# Patient Record
Sex: FEMALE | Race: WHITE | NOT HISPANIC OR LATINO
[De-identification: names, ages, dates, MRNs, and addresses within clinical notes are randomized per-mention and may not be internally consistent; named-entity substitution may affect disease eponyms.]

---

## 2019-11-04 PROBLEM — Z00.00 ENCOUNTER FOR PREVENTIVE HEALTH EXAMINATION: Status: ACTIVE | Noted: 2019-11-04

## 2019-11-05 ENCOUNTER — TRANSCRIPTION ENCOUNTER (OUTPATIENT)
Age: 29
End: 2019-11-05

## 2019-11-05 ENCOUNTER — APPOINTMENT (OUTPATIENT)
Dept: HEMATOLOGY ONCOLOGY | Facility: CLINIC | Age: 29
End: 2019-11-05
Payer: COMMERCIAL

## 2019-11-05 VITALS
DIASTOLIC BLOOD PRESSURE: 77 MMHG | BODY MASS INDEX: 21.49 KG/M2 | WEIGHT: 129 LBS | HEART RATE: 111 BPM | RESPIRATION RATE: 15 BRPM | OXYGEN SATURATION: 100 % | HEIGHT: 65 IN | TEMPERATURE: 97.9 F | SYSTOLIC BLOOD PRESSURE: 117 MMHG

## 2019-11-05 DIAGNOSIS — Z80.9 FAMILY HISTORY OF MALIGNANT NEOPLASM, UNSPECIFIED: ICD-10-CM

## 2019-11-05 DIAGNOSIS — Z81.8 FAMILY HISTORY OF OTHER MENTAL AND BEHAVIORAL DISORDERS: ICD-10-CM

## 2019-11-05 DIAGNOSIS — Z78.9 OTHER SPECIFIED HEALTH STATUS: ICD-10-CM

## 2019-11-05 DIAGNOSIS — D68.1 HEREDITARY FACTOR XI DEFICIENCY: ICD-10-CM

## 2019-11-05 DIAGNOSIS — Z82.3 FAMILY HISTORY OF STROKE: ICD-10-CM

## 2019-11-05 DIAGNOSIS — D56.3 THALASSEMIA MINOR: ICD-10-CM

## 2019-11-05 DIAGNOSIS — E53.8 DEFICIENCY OF OTHER SPECIFIED B GROUP VITAMINS: ICD-10-CM

## 2019-11-05 PROCEDURE — 96372 THER/PROPH/DIAG INJ SC/IM: CPT

## 2019-11-05 PROCEDURE — 99204 OFFICE O/P NEW MOD 45 MIN: CPT | Mod: 25

## 2019-11-05 PROCEDURE — 36415 COLL VENOUS BLD VENIPUNCTURE: CPT

## 2019-11-05 RX ORDER — ALBUTEROL SULFATE 108 UG/1
AEROSOL, METERED RESPIRATORY (INHALATION)
Refills: 0 | Status: ACTIVE | COMMUNITY

## 2019-11-05 RX ORDER — BUPROPION HYDROCHLORIDE 300 MG/1
300 TABLET, EXTENDED RELEASE ORAL DAILY
Refills: 0 | Status: ACTIVE | COMMUNITY

## 2019-11-05 RX ORDER — BACILLUS COAGULANS/INULIN 1B-250 MG
CAPSULE ORAL
Refills: 0 | Status: ACTIVE | COMMUNITY

## 2019-11-05 RX ORDER — CYANOCOBALAMIN 1000 UG/ML
1000 INJECTION INTRAMUSCULAR; SUBCUTANEOUS
Qty: 0 | Refills: 0 | Status: COMPLETED | OUTPATIENT
Start: 2019-11-05

## 2019-11-05 RX ORDER — ASCORBIC ACID, CHOLECALCIFEROL, .ALPHA.-TOCOPHEROL ACETATE, DL-, PYRIDOXINE HYDROCHLORIDE, FOLIC ACID, CYANOCOBALAMIN, BIOTIN, CALCIUM CARBONATE, FERROUS ASPARTO GLYCINATE, IRON, POTASSIUM IODIDE, MAGNESIUM OXIDE, DOCONEXENT AND LOWBUSH BLUEBERRY 60; 1000; 10; 26; 400; 13; 280; 80; 9; 9; 150; 25; 350; 25; 600 MG/1; [IU]/1; [IU]/1; MG/1; UG/1; UG/1; UG/1; MG/1; MG/1; MG/1; UG/1; MG/1; MG/1; MG/1; UG/1
CAPSULE, GELATIN COATED ORAL DAILY
Refills: 0 | Status: ACTIVE | COMMUNITY

## 2019-11-05 RX ORDER — CETIRIZINE HCL/PSEUDOEPHEDRINE 5 MG-120MG
TABLET, EXTENDED RELEASE 12 HR ORAL DAILY
Refills: 0 | Status: ACTIVE | COMMUNITY

## 2019-11-05 RX ADMIN — CYANOCOBALAMIN 0 MCG/ML: 1000 INJECTION INTRAMUSCULAR; SUBCUTANEOUS at 00:00

## 2019-11-06 ENCOUNTER — TRANSCRIPTION ENCOUNTER (OUTPATIENT)
Age: 29
End: 2019-11-06

## 2019-11-06 LAB
BASOPHILS # BLD AUTO: 0.05 K/UL
BASOPHILS NFR BLD AUTO: 0.7 %
EOSINOPHIL # BLD AUTO: 0.2 K/UL
EOSINOPHIL NFR BLD AUTO: 2.8 %
ERYTHROCYTE [SEDIMENTATION RATE] IN BLOOD BY WESTERGREN METHOD: 21 MM/HR
FACT XI ACT/NOR PPP: 41 %
FERRITIN SERPL-MCNC: 47 NG/ML
HAPTOGLOB SERPL-MCNC: 102 MG/DL
HCT VFR BLD CALC: 39.3 %
HGB BLD-MCNC: 12.3 G/DL
IMM GRANULOCYTES NFR BLD AUTO: 0.1 %
IRON SATN MFR SERPL: 22 %
IRON SERPL-MCNC: 73 UG/DL
LDH SERPL-CCNC: 154 U/L
LYMPHOCYTES # BLD AUTO: 2.56 K/UL
LYMPHOCYTES NFR BLD AUTO: 36.4 %
MAN DIFF?: NORMAL
MCHC RBC-ENTMCNC: 26.4 PG
MCHC RBC-ENTMCNC: 31.3 GM/DL
MCV RBC AUTO: 84.3 FL
MONOCYTES # BLD AUTO: 0.55 K/UL
MONOCYTES NFR BLD AUTO: 7.8 %
NEUTROPHILS # BLD AUTO: 3.66 K/UL
NEUTROPHILS NFR BLD AUTO: 52.2 %
PLATELET # BLD AUTO: 285 K/UL
RBC # BLD: 4.66 M/UL
RBC # FLD: 13.2 %
TIBC SERPL-MCNC: 332 UG/DL
UIBC SERPL-MCNC: 259 UG/DL
VIT B12 SERPL-MCNC: 374 PG/ML
WBC # FLD AUTO: 7.03 K/UL

## 2019-11-07 ENCOUNTER — TRANSCRIPTION ENCOUNTER (OUTPATIENT)
Age: 29
End: 2019-11-07

## 2019-11-11 ENCOUNTER — TRANSCRIPTION ENCOUNTER (OUTPATIENT)
Age: 29
End: 2019-11-11

## 2019-11-14 ENCOUNTER — TRANSCRIPTION ENCOUNTER (OUTPATIENT)
Age: 29
End: 2019-11-14

## 2019-11-14 PROBLEM — D56.3 ALPHA THALASSEMIA TRAIT: Status: ACTIVE | Noted: 2019-11-05

## 2019-11-14 LAB
ALPHA - GLOBIN COMMON MUTATION RESULT: NORMAL
ALPHA - GLOBIN MUTATION VERBATIM: NORMAL

## 2019-11-14 NOTE — CONSULT LETTER
[Dear  ___] : Dear  [unfilled], [Consult Letter:] : I had the pleasure of evaluating your patient, [unfilled]. [Consult Closing:] : Thank you very much for allowing me to participate in the care of this patient.  If you have any questions, please do not hesitate to contact me. [Please see my note below.] : Please see my note below. [Sincerely,] : Sincerely, [FreeTextEntry3] : Lydia Lawson MD\par

## 2019-11-14 NOTE — HISTORY OF PRESENT ILLNESS
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

## 2019-11-14 NOTE — ASSESSMENT
[FreeTextEntry1] : Repeat blood work as above..\par \par Pt has a factor XI def, and has a personal as well as family Hx of bleeding and will therefor require transfusion of FFP with spinal anesthesia for procedure (such as delivery, etc...)\par \par \par Alpha mutation gene analysis , was positive...pt is carrier for the mutation!

## 2019-11-18 ENCOUNTER — TRANSCRIPTION ENCOUNTER (OUTPATIENT)
Age: 29
End: 2019-11-18

## 2020-11-12 ENCOUNTER — TRANSCRIPTION ENCOUNTER (OUTPATIENT)
Age: 30
End: 2020-11-12

## 2021-03-23 ENCOUNTER — OUTPATIENT (OUTPATIENT)
Dept: OUTPATIENT SERVICES | Facility: HOSPITAL | Age: 31
LOS: 1 days | End: 2021-03-23
Payer: COMMERCIAL

## 2021-03-23 DIAGNOSIS — O26.899 OTHER SPECIFIED PREGNANCY RELATED CONDITIONS, UNSPECIFIED TRIMESTER: ICD-10-CM

## 2021-03-23 DIAGNOSIS — Z3A.00 WEEKS OF GESTATION OF PREGNANCY NOT SPECIFIED: ICD-10-CM

## 2021-03-23 PROCEDURE — 99214 OFFICE O/P EST MOD 30 MIN: CPT

## 2021-03-25 ENCOUNTER — INPATIENT (INPATIENT)
Facility: HOSPITAL | Age: 31
LOS: 1 days | Discharge: HOME | End: 2021-03-27
Attending: OBSTETRICS & GYNECOLOGY | Admitting: OBSTETRICS & GYNECOLOGY
Payer: COMMERCIAL

## 2021-03-25 VITALS — HEIGHT: 65 IN | WEIGHT: 175.27 LBS

## 2021-03-25 DIAGNOSIS — Z3A.00 WEEKS OF GESTATION OF PREGNANCY NOT SPECIFIED: ICD-10-CM

## 2021-03-25 DIAGNOSIS — O26.899 OTHER SPECIFIED PREGNANCY RELATED CONDITIONS, UNSPECIFIED TRIMESTER: ICD-10-CM

## 2021-03-25 LAB
ALBUMIN SERPL ELPH-MCNC: 3.9 G/DL — SIGNIFICANT CHANGE UP (ref 3.3–5)
ALP SERPL-CCNC: 137 U/L — HIGH (ref 40–120)
ALT FLD-CCNC: 17 U/L — SIGNIFICANT CHANGE UP (ref 10–45)
ANION GAP SERPL CALC-SCNC: 15 MMOL/L — SIGNIFICANT CHANGE UP (ref 5–17)
APPEARANCE UR: CLEAR — SIGNIFICANT CHANGE UP
APTT BLD: 32.6 SEC — SIGNIFICANT CHANGE UP (ref 27.5–35.5)
AST SERPL-CCNC: 20 U/L — SIGNIFICANT CHANGE UP (ref 10–40)
BACTERIA # UR AUTO: PRESENT /HPF
BASOPHILS # BLD AUTO: 0.06 K/UL — SIGNIFICANT CHANGE UP (ref 0–0.2)
BASOPHILS NFR BLD AUTO: 0.3 % — SIGNIFICANT CHANGE UP (ref 0–2)
BILIRUB SERPL-MCNC: 0.3 MG/DL — SIGNIFICANT CHANGE UP (ref 0.2–1.2)
BILIRUB UR-MCNC: NEGATIVE — SIGNIFICANT CHANGE UP
BLD GP AB SCN SERPL QL: NEGATIVE — SIGNIFICANT CHANGE UP
BUN SERPL-MCNC: 7 MG/DL — SIGNIFICANT CHANGE UP (ref 7–23)
CALCIUM SERPL-MCNC: 9.2 MG/DL — SIGNIFICANT CHANGE UP (ref 8.4–10.5)
CHLORIDE SERPL-SCNC: 100 MMOL/L — SIGNIFICANT CHANGE UP (ref 96–108)
CO2 SERPL-SCNC: 20 MMOL/L — LOW (ref 22–31)
COLOR SPEC: YELLOW — SIGNIFICANT CHANGE UP
COVID-19 SPIKE DOMAIN AB INTERP: NEGATIVE — SIGNIFICANT CHANGE UP
COVID-19 SPIKE DOMAIN ANTIBODY RESULT: 0.4 U/ML — SIGNIFICANT CHANGE UP
CREAT ?TM UR-MCNC: 223 MG/DL — SIGNIFICANT CHANGE UP
CREAT SERPL-MCNC: 0.62 MG/DL — SIGNIFICANT CHANGE UP (ref 0.5–1.3)
DIFF PNL FLD: ABNORMAL
EOSINOPHIL # BLD AUTO: 0.04 K/UL — SIGNIFICANT CHANGE UP (ref 0–0.5)
EOSINOPHIL NFR BLD AUTO: 0.2 % — SIGNIFICANT CHANGE UP (ref 0–6)
EPI CELLS # UR: ABNORMAL /HPF (ref 0–5)
FIBRINOGEN PPP-MCNC: 673 MG/DL — HIGH (ref 258–438)
GLUCOSE SERPL-MCNC: 123 MG/DL — HIGH (ref 70–99)
GLUCOSE UR QL: NEGATIVE — SIGNIFICANT CHANGE UP
HCT VFR BLD CALC: 33.9 % — LOW (ref 34.5–45)
HGB BLD-MCNC: 10.8 G/DL — LOW (ref 11.5–15.5)
IMM GRANULOCYTES NFR BLD AUTO: 0.7 % — SIGNIFICANT CHANGE UP (ref 0–1.5)
INR BLD: 1.01 — SIGNIFICANT CHANGE UP (ref 0.88–1.16)
KETONES UR-MCNC: NEGATIVE — SIGNIFICANT CHANGE UP
LDH SERPL L TO P-CCNC: 158 U/L — SIGNIFICANT CHANGE UP (ref 50–242)
LEUKOCYTE ESTERASE UR-ACNC: ABNORMAL
LYMPHOCYTES # BLD AUTO: 1.17 K/UL — SIGNIFICANT CHANGE UP (ref 1–3.3)
LYMPHOCYTES # BLD AUTO: 6.5 % — LOW (ref 13–44)
MCHC RBC-ENTMCNC: 26.3 PG — LOW (ref 27–34)
MCHC RBC-ENTMCNC: 31.9 GM/DL — LOW (ref 32–36)
MCV RBC AUTO: 82.5 FL — SIGNIFICANT CHANGE UP (ref 80–100)
MONOCYTES # BLD AUTO: 0.73 K/UL — SIGNIFICANT CHANGE UP (ref 0–0.9)
MONOCYTES NFR BLD AUTO: 4 % — SIGNIFICANT CHANGE UP (ref 2–14)
NEUTROPHILS # BLD AUTO: 16.01 K/UL — HIGH (ref 1.8–7.4)
NEUTROPHILS NFR BLD AUTO: 88.3 % — HIGH (ref 43–77)
NITRITE UR-MCNC: NEGATIVE — SIGNIFICANT CHANGE UP
NRBC # BLD: 0 /100 WBCS — SIGNIFICANT CHANGE UP (ref 0–0)
PH UR: 6.5 — SIGNIFICANT CHANGE UP (ref 5–8)
PLATELET # BLD AUTO: 253 K/UL — SIGNIFICANT CHANGE UP (ref 150–400)
POTASSIUM SERPL-MCNC: 3.8 MMOL/L — SIGNIFICANT CHANGE UP (ref 3.5–5.3)
POTASSIUM SERPL-SCNC: 3.8 MMOL/L — SIGNIFICANT CHANGE UP (ref 3.5–5.3)
PROT ?TM UR-MCNC: 37 MG/DL — HIGH (ref 0–12)
PROT SERPL-MCNC: 7.1 G/DL — SIGNIFICANT CHANGE UP (ref 6–8.3)
PROT UR-MCNC: ABNORMAL MG/DL
PROT/CREAT UR-RTO: 0.2 RATIO — SIGNIFICANT CHANGE UP (ref 0–0.2)
PROTHROM AB SERPL-ACNC: 12.1 SEC — SIGNIFICANT CHANGE UP (ref 10.6–13.6)
RBC # BLD: 4.11 M/UL — SIGNIFICANT CHANGE UP (ref 3.8–5.2)
RBC # FLD: 14.8 % — HIGH (ref 10.3–14.5)
RBC CASTS # UR COMP ASSIST: ABNORMAL /HPF
RH IG SCN BLD-IMP: POSITIVE — SIGNIFICANT CHANGE UP
RH IG SCN BLD-IMP: POSITIVE — SIGNIFICANT CHANGE UP
SARS-COV-2 IGG+IGM SERPL QL IA: 0.4 U/ML — SIGNIFICANT CHANGE UP
SARS-COV-2 IGG+IGM SERPL QL IA: NEGATIVE — SIGNIFICANT CHANGE UP
SARS-COV-2 RNA SPEC QL NAA+PROBE: SIGNIFICANT CHANGE UP
SODIUM SERPL-SCNC: 135 MMOL/L — SIGNIFICANT CHANGE UP (ref 135–145)
SP GR SPEC: 1.02 — SIGNIFICANT CHANGE UP (ref 1–1.03)
T PALLIDUM AB TITR SER: NEGATIVE — SIGNIFICANT CHANGE UP
URATE SERPL-MCNC: 4.7 MG/DL — SIGNIFICANT CHANGE UP (ref 2.5–7)
UROBILINOGEN FLD QL: 0.2 E.U./DL — SIGNIFICANT CHANGE UP
WBC # BLD: 18.13 K/UL — HIGH (ref 3.8–10.5)
WBC # FLD AUTO: 18.13 K/UL — HIGH (ref 3.8–10.5)
WBC UR QL: < 5 /HPF — SIGNIFICANT CHANGE UP

## 2021-03-25 RX ORDER — OXYTOCIN 10 UNIT/ML
333.33 VIAL (ML) INJECTION
Qty: 20 | Refills: 0 | Status: DISCONTINUED | OUTPATIENT
Start: 2021-03-25 | End: 2021-03-27

## 2021-03-25 RX ORDER — AMPICILLIN SODIUM AND SULBACTAM SODIUM 250; 125 MG/ML; MG/ML
3 INJECTION, POWDER, FOR SUSPENSION INTRAMUSCULAR; INTRAVENOUS EVERY 6 HOURS
Refills: 0 | Status: COMPLETED | OUTPATIENT
Start: 2021-03-26 | End: 2021-03-26

## 2021-03-25 RX ORDER — SODIUM CHLORIDE 9 MG/ML
5001 INJECTION, SOLUTION INTRAVENOUS ONCE
Refills: 0 | Status: DISCONTINUED | OUTPATIENT
Start: 2021-03-25 | End: 2021-03-25

## 2021-03-25 RX ORDER — SIMETHICONE 80 MG/1
80 TABLET, CHEWABLE ORAL EVERY 4 HOURS
Refills: 0 | Status: DISCONTINUED | OUTPATIENT
Start: 2021-03-25 | End: 2021-03-27

## 2021-03-25 RX ORDER — OXYCODONE HYDROCHLORIDE 5 MG/1
5 TABLET ORAL
Refills: 0 | Status: DISCONTINUED | OUTPATIENT
Start: 2021-03-25 | End: 2021-03-27

## 2021-03-25 RX ORDER — AMPICILLIN TRIHYDRATE 250 MG
1 CAPSULE ORAL EVERY 4 HOURS
Refills: 0 | Status: DISCONTINUED | OUTPATIENT
Start: 2021-03-25 | End: 2021-03-25

## 2021-03-25 RX ORDER — AER TRAVELER 0.5 G/1
1 SOLUTION RECTAL; TOPICAL EVERY 4 HOURS
Refills: 0 | Status: DISCONTINUED | OUTPATIENT
Start: 2021-03-25 | End: 2021-03-27

## 2021-03-25 RX ORDER — FENTANYL/BUPIVACAINE/NS/PF 2MCG/ML-.1
250 PLASTIC BAG, INJECTION (ML) INJECTION
Refills: 0 | Status: DISCONTINUED | OUTPATIENT
Start: 2021-03-25 | End: 2021-03-25

## 2021-03-25 RX ORDER — SODIUM CHLORIDE 9 MG/ML
3 INJECTION INTRAMUSCULAR; INTRAVENOUS; SUBCUTANEOUS EVERY 8 HOURS
Refills: 0 | Status: DISCONTINUED | OUTPATIENT
Start: 2021-03-25 | End: 2021-03-27

## 2021-03-25 RX ORDER — OXYCODONE HYDROCHLORIDE 5 MG/1
5 TABLET ORAL ONCE
Refills: 0 | Status: DISCONTINUED | OUTPATIENT
Start: 2021-03-25 | End: 2021-03-27

## 2021-03-25 RX ORDER — KETOROLAC TROMETHAMINE 30 MG/ML
30 SYRINGE (ML) INJECTION ONCE
Refills: 0 | Status: DISCONTINUED | OUTPATIENT
Start: 2021-03-25 | End: 2021-03-25

## 2021-03-25 RX ORDER — ACETAMINOPHEN 500 MG
1000 TABLET ORAL ONCE
Refills: 0 | Status: DISCONTINUED | OUTPATIENT
Start: 2021-03-25 | End: 2021-03-27

## 2021-03-25 RX ORDER — GENTAMICIN SULFATE 40 MG/ML
330 VIAL (ML) INJECTION ONCE
Refills: 0 | Status: COMPLETED | OUTPATIENT
Start: 2021-03-25 | End: 2021-03-25

## 2021-03-25 RX ORDER — MAGNESIUM HYDROXIDE 400 MG/1
30 TABLET, CHEWABLE ORAL
Refills: 0 | Status: DISCONTINUED | OUTPATIENT
Start: 2021-03-25 | End: 2021-03-27

## 2021-03-25 RX ORDER — LANOLIN
1 OINTMENT (GRAM) TOPICAL EVERY 6 HOURS
Refills: 0 | Status: DISCONTINUED | OUTPATIENT
Start: 2021-03-25 | End: 2021-03-27

## 2021-03-25 RX ORDER — AMPICILLIN TRIHYDRATE 250 MG
2 CAPSULE ORAL ONCE
Refills: 0 | Status: COMPLETED | OUTPATIENT
Start: 2021-03-25 | End: 2021-03-25

## 2021-03-25 RX ORDER — ACETAMINOPHEN 500 MG
1000 TABLET ORAL ONCE
Refills: 0 | Status: COMPLETED | OUTPATIENT
Start: 2021-03-25 | End: 2021-03-25

## 2021-03-25 RX ORDER — SODIUM CHLORIDE 9 MG/ML
1000 INJECTION, SOLUTION INTRAVENOUS
Refills: 0 | Status: DISCONTINUED | OUTPATIENT
Start: 2021-03-25 | End: 2021-03-25

## 2021-03-25 RX ORDER — PRAMOXINE HYDROCHLORIDE 150 MG/15G
1 AEROSOL, FOAM RECTAL EVERY 4 HOURS
Refills: 0 | Status: DISCONTINUED | OUTPATIENT
Start: 2021-03-25 | End: 2021-03-27

## 2021-03-25 RX ORDER — IBUPROFEN 200 MG
600 TABLET ORAL EVERY 6 HOURS
Refills: 0 | Status: COMPLETED | OUTPATIENT
Start: 2021-03-25 | End: 2022-02-21

## 2021-03-25 RX ORDER — CITRIC ACID/SODIUM CITRATE 300-500 MG
15 SOLUTION, ORAL ORAL EVERY 6 HOURS
Refills: 0 | Status: DISCONTINUED | OUTPATIENT
Start: 2021-03-25 | End: 2021-03-25

## 2021-03-25 RX ORDER — ACETAMINOPHEN 500 MG
975 TABLET ORAL EVERY 6 HOURS
Refills: 0 | Status: COMPLETED | OUTPATIENT
Start: 2021-03-25 | End: 2022-02-21

## 2021-03-25 RX ORDER — TETANUS TOXOID, REDUCED DIPHTHERIA TOXOID AND ACELLULAR PERTUSSIS VACCINE, ADSORBED 5; 2.5; 8; 8; 2.5 [IU]/.5ML; [IU]/.5ML; UG/.5ML; UG/.5ML; UG/.5ML
0.5 SUSPENSION INTRAMUSCULAR ONCE
Refills: 0 | Status: DISCONTINUED | OUTPATIENT
Start: 2021-03-25 | End: 2021-03-27

## 2021-03-25 RX ORDER — HYDROCORTISONE 1 %
1 OINTMENT (GRAM) TOPICAL EVERY 6 HOURS
Refills: 0 | Status: DISCONTINUED | OUTPATIENT
Start: 2021-03-25 | End: 2021-03-27

## 2021-03-25 RX ORDER — DIBUCAINE 1 %
1 OINTMENT (GRAM) RECTAL EVERY 6 HOURS
Refills: 0 | Status: DISCONTINUED | OUTPATIENT
Start: 2021-03-25 | End: 2021-03-27

## 2021-03-25 RX ORDER — BENZOCAINE 10 %
1 GEL (GRAM) MUCOUS MEMBRANE EVERY 6 HOURS
Refills: 0 | Status: DISCONTINUED | OUTPATIENT
Start: 2021-03-25 | End: 2021-03-27

## 2021-03-25 RX ORDER — DIPHENHYDRAMINE HCL 50 MG
25 CAPSULE ORAL EVERY 6 HOURS
Refills: 0 | Status: DISCONTINUED | OUTPATIENT
Start: 2021-03-25 | End: 2021-03-27

## 2021-03-25 RX ORDER — GENTAMICIN SULFATE 40 MG/ML
400 VIAL (ML) INJECTION ONCE
Refills: 0 | Status: DISCONTINUED | OUTPATIENT
Start: 2021-03-25 | End: 2021-03-25

## 2021-03-25 RX ORDER — OXYTOCIN 10 UNIT/ML
2 VIAL (ML) INJECTION
Qty: 30 | Refills: 0 | Status: DISCONTINUED | OUTPATIENT
Start: 2021-03-25 | End: 2021-03-25

## 2021-03-25 RX ORDER — OXYTOCIN 10 UNIT/ML
333.33 VIAL (ML) INJECTION
Qty: 20 | Refills: 0 | Status: DISCONTINUED | OUTPATIENT
Start: 2021-03-25 | End: 2021-03-25

## 2021-03-25 RX ADMIN — Medication 1000 MILLIGRAM(S): at 16:45

## 2021-03-25 RX ADMIN — Medication 216 GRAM(S): at 11:00

## 2021-03-25 RX ADMIN — Medication 2 MILLIUNIT(S)/MIN: at 12:13

## 2021-03-25 RX ADMIN — Medication 108 GRAM(S): at 15:30

## 2021-03-25 RX ADMIN — Medication 30 MILLIGRAM(S): at 23:00

## 2021-03-25 RX ADMIN — Medication 15 MILLILITER(S): at 13:32

## 2021-03-25 RX ADMIN — SODIUM CHLORIDE 125 MILLILITER(S): 9 INJECTION, SOLUTION INTRAVENOUS at 12:14

## 2021-03-25 RX ADMIN — Medication 1000 MILLIUNIT(S)/MIN: at 20:13

## 2021-03-25 RX ADMIN — Medication 108 GRAM(S): at 19:05

## 2021-03-25 RX ADMIN — Medication 30 MILLIGRAM(S): at 22:46

## 2021-03-25 RX ADMIN — Medication 400 MILLIGRAM(S): at 13:45

## 2021-03-25 RX ADMIN — SODIUM CHLORIDE 3 MILLILITER(S): 9 INJECTION INTRAMUSCULAR; INTRAVENOUS; SUBCUTANEOUS at 22:55

## 2021-03-25 RX ADMIN — Medication 200 MILLIGRAM(S): at 16:44

## 2021-03-26 ENCOUNTER — RESULT REVIEW (OUTPATIENT)
Age: 31
End: 2021-03-26

## 2021-03-26 LAB
CULTURE RESULTS: SIGNIFICANT CHANGE UP
SPECIMEN SOURCE: SIGNIFICANT CHANGE UP

## 2021-03-26 PROCEDURE — 88307 TISSUE EXAM BY PATHOLOGIST: CPT | Mod: 26

## 2021-03-26 RX ORDER — ACETAMINOPHEN 500 MG
975 TABLET ORAL EVERY 6 HOURS
Refills: 0 | Status: DISCONTINUED | OUTPATIENT
Start: 2021-03-26 | End: 2021-03-27

## 2021-03-26 RX ORDER — IBUPROFEN 200 MG
600 TABLET ORAL EVERY 6 HOURS
Refills: 0 | Status: DISCONTINUED | OUTPATIENT
Start: 2021-03-26 | End: 2021-03-27

## 2021-03-26 RX ADMIN — SODIUM CHLORIDE 3 MILLILITER(S): 9 INJECTION INTRAMUSCULAR; INTRAVENOUS; SUBCUTANEOUS at 15:23

## 2021-03-26 RX ADMIN — Medication 600 MILLIGRAM(S): at 12:11

## 2021-03-26 RX ADMIN — AMPICILLIN SODIUM AND SULBACTAM SODIUM 200 GRAM(S): 250; 125 INJECTION, POWDER, FOR SUSPENSION INTRAMUSCULAR; INTRAVENOUS at 06:59

## 2021-03-26 RX ADMIN — SODIUM CHLORIDE 3 MILLILITER(S): 9 INJECTION INTRAMUSCULAR; INTRAVENOUS; SUBCUTANEOUS at 22:54

## 2021-03-26 RX ADMIN — Medication 600 MILLIGRAM(S): at 12:41

## 2021-03-26 RX ADMIN — Medication 1 TABLET(S): at 12:10

## 2021-03-26 RX ADMIN — Medication 1 APPLICATION(S): at 06:55

## 2021-03-26 RX ADMIN — AMPICILLIN SODIUM AND SULBACTAM SODIUM 200 GRAM(S): 250; 125 INJECTION, POWDER, FOR SUSPENSION INTRAMUSCULAR; INTRAVENOUS at 12:10

## 2021-03-26 RX ADMIN — Medication 600 MILLIGRAM(S): at 06:58

## 2021-03-26 RX ADMIN — Medication 1 SPRAY(S): at 06:55

## 2021-03-26 RX ADMIN — SODIUM CHLORIDE 3 MILLILITER(S): 9 INJECTION INTRAMUSCULAR; INTRAVENOUS; SUBCUTANEOUS at 06:58

## 2021-03-26 RX ADMIN — Medication 975 MILLIGRAM(S): at 04:00

## 2021-03-26 RX ADMIN — Medication 975 MILLIGRAM(S): at 22:00

## 2021-03-26 RX ADMIN — Medication 600 MILLIGRAM(S): at 07:33

## 2021-03-26 RX ADMIN — Medication 975 MILLIGRAM(S): at 20:59

## 2021-03-26 RX ADMIN — Medication 1 APPLICATION(S): at 06:54

## 2021-03-26 RX ADMIN — AMPICILLIN SODIUM AND SULBACTAM SODIUM 200 GRAM(S): 250; 125 INJECTION, POWDER, FOR SUSPENSION INTRAMUSCULAR; INTRAVENOUS at 01:00

## 2021-03-26 RX ADMIN — Medication 975 MILLIGRAM(S): at 03:00

## 2021-03-26 RX ADMIN — Medication 600 MILLIGRAM(S): at 17:40

## 2021-03-26 NOTE — LACTATION INITIAL EVALUATION - POTENTIAL FOR
baby in NICU. mother reports baby latched after delivery, but is not sustaining while in the NICU. she has been able to pump or hand express and bring colostrum to nicu./ineffective breastfeeding/knowledge deficit/feeding confusion

## 2021-03-26 NOTE — LACTATION INITIAL EVALUATION - PRO FEEDING PLAN INFANT OB
initiation of breastfeeding/breast milk feeding
continue current management and present  medications

## 2021-03-26 NOTE — LACTATION INITIAL EVALUATION - NS LACT CON REASON FOR REQ
23hr old  in NICU for maternal temp. for transfer to [facility] sometime tonight. mom reports she has been pumping and hand expressing and sending colostrum to NICU. encouraged s2s when able. mother aware to offer breast q2-3h and on demand, if no latch she is instructed to pump or hand express 15-20min and offer expressed colostrum. made aware of lc availability for further support when desired./primaparous mom

## 2021-03-27 ENCOUNTER — TRANSCRIPTION ENCOUNTER (OUTPATIENT)
Age: 31
End: 2021-03-27

## 2021-03-27 VITALS
RESPIRATION RATE: 18 BRPM | DIASTOLIC BLOOD PRESSURE: 69 MMHG | OXYGEN SATURATION: 99 % | HEART RATE: 87 BPM | TEMPERATURE: 98 F | SYSTOLIC BLOOD PRESSURE: 110 MMHG

## 2021-03-27 PROCEDURE — 87086 URINE CULTURE/COLONY COUNT: CPT

## 2021-03-27 PROCEDURE — 36415 COLL VENOUS BLD VENIPUNCTURE: CPT

## 2021-03-27 PROCEDURE — 84550 ASSAY OF BLOOD/URIC ACID: CPT

## 2021-03-27 PROCEDURE — 85610 PROTHROMBIN TIME: CPT

## 2021-03-27 PROCEDURE — 85730 THROMBOPLASTIN TIME PARTIAL: CPT

## 2021-03-27 PROCEDURE — 83615 LACTATE (LD) (LDH) ENZYME: CPT

## 2021-03-27 PROCEDURE — 86900 BLOOD TYPING SEROLOGIC ABO: CPT

## 2021-03-27 PROCEDURE — 81001 URINALYSIS AUTO W/SCOPE: CPT

## 2021-03-27 PROCEDURE — 80053 COMPREHEN METABOLIC PANEL: CPT

## 2021-03-27 PROCEDURE — 86780 TREPONEMA PALLIDUM: CPT

## 2021-03-27 PROCEDURE — 86901 BLOOD TYPING SEROLOGIC RH(D): CPT

## 2021-03-27 PROCEDURE — 99214 OFFICE O/P EST MOD 30 MIN: CPT

## 2021-03-27 PROCEDURE — 85025 COMPLETE CBC W/AUTO DIFF WBC: CPT

## 2021-03-27 PROCEDURE — 84156 ASSAY OF PROTEIN URINE: CPT

## 2021-03-27 PROCEDURE — 87635 SARS-COV-2 COVID-19 AMP PRB: CPT

## 2021-03-27 PROCEDURE — 86769 SARS-COV-2 COVID-19 ANTIBODY: CPT

## 2021-03-27 PROCEDURE — 87040 BLOOD CULTURE FOR BACTERIA: CPT

## 2021-03-27 PROCEDURE — 86850 RBC ANTIBODY SCREEN: CPT

## 2021-03-27 PROCEDURE — 88307 TISSUE EXAM BY PATHOLOGIST: CPT

## 2021-03-27 PROCEDURE — 82570 ASSAY OF URINE CREATININE: CPT

## 2021-03-27 PROCEDURE — 85384 FIBRINOGEN ACTIVITY: CPT

## 2021-03-27 RX ORDER — IBUPROFEN 200 MG
1 TABLET ORAL
Qty: 0 | Refills: 0 | DISCHARGE
Start: 2021-03-27

## 2021-03-27 RX ORDER — ACETAMINOPHEN 500 MG
3 TABLET ORAL
Qty: 0 | Refills: 0 | DISCHARGE
Start: 2021-03-27

## 2021-03-27 RX ADMIN — Medication 600 MILLIGRAM(S): at 19:13

## 2021-03-27 RX ADMIN — Medication 975 MILLIGRAM(S): at 16:32

## 2021-03-27 RX ADMIN — Medication 1 TABLET(S): at 12:10

## 2021-03-27 RX ADMIN — SODIUM CHLORIDE 3 MILLILITER(S): 9 INJECTION INTRAMUSCULAR; INTRAVENOUS; SUBCUTANEOUS at 06:50

## 2021-03-27 RX ADMIN — Medication 975 MILLIGRAM(S): at 09:12

## 2021-03-27 RX ADMIN — Medication 975 MILLIGRAM(S): at 17:05

## 2021-03-27 RX ADMIN — Medication 600 MILLIGRAM(S): at 12:40

## 2021-03-27 RX ADMIN — Medication 600 MILLIGRAM(S): at 00:22

## 2021-03-27 RX ADMIN — Medication 600 MILLIGRAM(S): at 01:30

## 2021-03-27 RX ADMIN — Medication 600 MILLIGRAM(S): at 18:43

## 2021-03-27 RX ADMIN — Medication 600 MILLIGRAM(S): at 06:49

## 2021-03-27 RX ADMIN — Medication 600 MILLIGRAM(S): at 05:45

## 2021-03-27 RX ADMIN — Medication 600 MILLIGRAM(S): at 12:10

## 2021-03-27 RX ADMIN — Medication 975 MILLIGRAM(S): at 09:42

## 2021-03-27 NOTE — PROGRESS NOTE ADULT - SUBJECTIVE AND OBJECTIVE BOX
Patient evaluated at bedside.      She reports pain is well controlled with medications.     She has been ambulating without assistance, voiding spontaneously, tolerating solid food and PO fluids.     She denies fevers, HA, vision changes, dizziness, chest pain, palpitations, shortness of breath, n/v, ruq/epigastric pain, heavy vaginal bleeding or perineal discomfort.    Physical Exam:  Vital Signs Last 24 Hrs  T(C): 36.3 (26 Mar 2021 06:32), Max: 37.2 (25 Mar 2021 20:00)  T(F): 97.4 (26 Mar 2021 06:32), Max: 99 (25 Mar 2021 20:00)  HR: 76 (26 Mar 2021 06:32) (76 - 118)  BP: 137/91 (26 Mar 2021 06:32) (106/67 - 137/91)  RR: 17 (26 Mar 2021 06:32) (17 - 18)  SpO2: 100% (26 Mar 2021 06:32) (98% - 100%)    GA: NAD  Abd: + BS, soft, nontender, nondistended, no rebound or guarding, uterus firm at midline below the umbilicus  : lochia WNL  Extremities: no calf tenderness                          10.8   18.13 )-----------( 253      ( 25 Mar 2021 11:00 )             33.9     03-25    135  |  100  |  7   ----------------------------<  123<H>  3.8   |  20<L>  |  0.62    Ca    9.2      25 Mar 2021 11:00    TPro  7.1  /  Alb  3.9  /  TBili  0.3  /  DBili  x   /  AST  20  /  ALT  17  /  AlkPhos  137<H>  03-25    PT/INR - ( 25 Mar 2021 11:00 )   PT: 12.1 sec;   INR: 1.01          PTT - ( 25 Mar 2021 11:00 )  PTT:32.6 sec    
Patient evaluated at bedside.  No acute events overnight.    She reports pain is well controlled with medications.     She has been ambulating without assistance and is voiding spontaneously. Reports decrease in vaginal bleeding and denies clots.     She denies HA, dizziness, chest pain, palpitations, shortness of breath, n/v, heavy vaginal bleeding or perineal discomfort.    Physical Exam:  Vital Signs Last 24 Hrs  T(C): 36.3 (27 Mar 2021 06:00), Max: 36.7 (26 Mar 2021 14:31)  T(F): 97.4 (27 Mar 2021 06:00), Max: 98 (26 Mar 2021 14:31)  HR: 84 (27 Mar 2021 06:00) (79 - 89)  BP: 118/80 (27 Mar 2021 06:00) (118/80 - 128/76)  BP(mean): --  RR: 17 (27 Mar 2021 06:00) (16 - 17)  SpO2: 100% (27 Mar 2021 06:00) (97% - 100%)    GA: NAD, A+0 x 3  Abd: + BS, soft, appropriately tender, nondistended, no rebound or guarding, uterus firm at midline  : lochia WNL  Extremities: no edema or calf tenderness                          10.8   18.13 )-----------( 253      ( 25 Mar 2021 11:00 )             33.9     03-25    135  |  100  |  7   ----------------------------<  123<H>  3.8   |  20<L>  |  0.62    Ca    9.2      25 Mar 2021 11:00    TPro  7.1  /  Alb  3.9  /  TBili  0.3  /  DBili  x   /  AST  20  /  ALT  17  /  AlkPhos  137<H>  03-25    PT/INR - ( 25 Mar 2021 11:00 )   PT: 12.1 sec;   INR: 1.01          PTT - ( 25 Mar 2021 11:00 )  PTT:32.6 sec

## 2021-03-27 NOTE — DISCHARGE NOTE OB - CARE PROVIDER_API CALL
Kiki Horne)  Obstetrics and Gynecology  [address] [place] [address]  Phone: [phone]  Fax: [phone]  Follow Up Time:

## 2021-03-27 NOTE — DISCHARGE NOTE OB - PATIENT PORTAL LINK FT
You can access the FollowMyHealth Patient Portal offered by [facility] by registering at the following website: [email or web address]. By joining [facility]’s FollowMyHealth portal, you will also be able to view your health information using other applications (apps) compatible with our system.

## 2021-03-27 NOTE — DISCHARGE NOTE OB - PLAN OF CARE
Monitor blood pressure twice daily with an electronic blood pressure cuff. Document blood pressures to review with obstetrician at follow-up appointment. Return to hospital for systolic blood pressure (top number) equal to or greater than 160 AND/OR diastolic blood pressure (bottom number)equal to or greater than 110 OR any of the following symptoms: changes in vision, headache not relieved with Tylenol, severe abdominal pain, vomiting, increased vaginal bleeding, chest pain or shortness of breath. Call obstetrician for persistent systolic blood pressure (top number) equal to or greater than 150 AND/OR diastolic blood pressure (bottom number) equal to or greater than 100. Follow-up with obstetrician in 1 week for blood pressure check. s/p Unasyn. Afebrile on day of discharge. Feel well 30y female s/p vaginal delivery, postpartum day 2, meeting all postpartum milestones. Pelvic rest until cleared. Follow-up with obstetrician in 1 week. Blood pressure control Afebrile

## 2021-03-27 NOTE — DISCHARGE NOTE OB - CARE PLAN
Principal Discharge DX:	Postpartum state  Goal:	Feel well  Assessment and plan of treatment:	30y female s/p vaginal delivery, postpartum day 2, meeting all postpartum milestones. Pelvic rest until cleared. Follow-up with obstetrician in 1 week.  Secondary Diagnosis:	Gestational hypertension, third trimester  Goal:	Blood pressure control  Assessment and plan of treatment:	Monitor blood pressure twice daily with an electronic blood pressure cuff. Document blood pressures to review with obstetrician at follow-up appointment. Return to hospital for systolic blood pressure (top number) equal to or greater than 160 AND/OR diastolic blood pressure (bottom number)equal to or greater than 110 OR any of the following symptoms: changes in vision, headache not relieved with Tylenol, severe abdominal pain, vomiting, increased vaginal bleeding, chest pain or shortness of breath. Call obstetrician for persistent systolic blood pressure (top number) equal to or greater than 150 AND/OR diastolic blood pressure (bottom number) equal to or greater than 100. Follow-up with obstetrician in 1 week for blood pressure check.  Secondary Diagnosis:	Chorioamnionitis  Goal:	Afebrile  Assessment and plan of treatment:	s/p Unasyn. Afebrile on day of discharge.

## 2021-03-27 NOTE — DISCHARGE NOTE OB - HOSPITAL COURSE
A/P yo 30y  s/p , PP# 2, c/b gHTN and chorioamnionitis, stable  1. gHTN: normotensive overnight  2. chorio: afebrile ON, s/p Unasyn 3g q6h, normal lochia  3. Pain: OPM  4. GI: Reg  5. :  Voiding  6. DVT prophylaxis: SCDs, ambulation  7. Dispo: PP#2

## 2021-03-27 NOTE — DISCHARGE NOTE OB - MATERIALS PROVIDED
Vaccinations/[facility]  Screening Program/  Immunization Record/Breastfeeding Log/Bottle Feeding Log/Breastfeeding Mother’s Support Group Information/Guide to Postpartum Care/[facility] Hearing Screen Program/Back To Sleep Handout/Shaken Baby Prevention Handout/Breastfeeding Guide and Packet/Birth Certificate Instructions/Discharge Medication Information for Patients and Families Pocket Guide/Tdap Vaccination (VIS Pub Date: 2012)

## 2021-03-27 NOTE — PROGRESS NOTE ADULT - ASSESSMENT
A/P yo 30y  s/p , PP# 1, c/b gHTN and chorioamnionitis, stable  1. gHTN: intermittently mild range BP's ON, no toxic s/s  1. chorio: afebrile ON, on Unasyn 3g q6h, normal lochia  1. Pain: OPM  2. GI: Reg  3. :  Voiding  4. DVT prophylaxis: SCDs, ambulation  5. Dispo: PP#1 or 2 unless otherwise specified
A/P yo 30y  s/p , PP# 2, c/b gHTN and chorioamnionitis, stable  1. gHTN: normotensive overnight  2. chorio: afebrile ON, s/p Unasyn 3g q6h, normal lochia  3. Pain: OPM  4. GI: Reg  5. :  Voiding  6. DVT prophylaxis: SCDs, ambulation  7. Dispo: PP#2

## 2021-03-29 LAB — SURGICAL PATHOLOGY STUDY: SIGNIFICANT CHANGE UP

## 2021-03-30 LAB
CULTURE RESULTS: SIGNIFICANT CHANGE UP
CULTURE RESULTS: SIGNIFICANT CHANGE UP
SPECIMEN SOURCE: SIGNIFICANT CHANGE UP
SPECIMEN SOURCE: SIGNIFICANT CHANGE UP

## 2021-03-31 DIAGNOSIS — Z14.8 GENETIC CARRIER OF OTHER DISEASE: ICD-10-CM

## 2021-03-31 DIAGNOSIS — J45.909 UNSPECIFIED ASTHMA, UNCOMPLICATED: ICD-10-CM

## 2021-03-31 DIAGNOSIS — F41.9 ANXIETY DISORDER, UNSPECIFIED: ICD-10-CM

## 2021-03-31 DIAGNOSIS — Z79.899 OTHER LONG TERM (CURRENT) DRUG THERAPY: ICD-10-CM

## 2021-03-31 DIAGNOSIS — Z3A.38 38 WEEKS GESTATION OF PREGNANCY: ICD-10-CM

## 2021-03-31 DIAGNOSIS — O41.1230 CHORIOAMNIONITIS, THIRD TRIMESTER, NOT APPLICABLE OR UNSPECIFIED: ICD-10-CM

## 2021-03-31 DIAGNOSIS — Z67.40 TYPE O BLOOD, RH POSITIVE: ICD-10-CM

## 2022-07-25 ENCOUNTER — OFFICE (OUTPATIENT)
Dept: URBAN - METROPOLITAN AREA CLINIC 28 | Facility: CLINIC | Age: 32
Setting detail: OPHTHALMOLOGY
End: 2022-07-25
Payer: COMMERCIAL

## 2022-07-25 VITALS — WEIGHT: 140 LBS | BODY MASS INDEX: 23.32 KG/M2 | HEIGHT: 65 IN

## 2022-07-25 DIAGNOSIS — G43.109: ICD-10-CM

## 2022-07-25 DIAGNOSIS — H52.03: ICD-10-CM

## 2022-07-25 PROCEDURE — 92015 DETERMINE REFRACTIVE STATE: CPT | Performed by: OPHTHALMOLOGY

## 2022-07-25 PROCEDURE — 92004 COMPRE OPH EXAM NEW PT 1/>: CPT | Performed by: OPHTHALMOLOGY

## 2022-07-25 ASSESSMENT — TONOMETRY
OS_IOP_MMHG: 18
OD_IOP_MMHG: 16

## 2022-07-25 ASSESSMENT — CONFRONTATIONAL VISUAL FIELD TEST (CVF)
OS_FINDINGS: FULL
OD_FINDINGS: FULL

## 2022-07-26 ASSESSMENT — REFRACTION_AUTOREFRACTION
OS_CYLINDER: -1.50
OS_SPHERE: +3.00
OD_SPHERE: +3.00
OD_CYLINDER: -0.75
OS_AXIS: 177
OD_AXIS: 008

## 2022-07-26 ASSESSMENT — VISUAL ACUITY
OS_BCVA: 20/30
OD_BCVA: 20/40

## 2022-07-26 ASSESSMENT — SPHEQUIV_DERIVED
OS_SPHEQUIV: 2.25
OS_SPHEQUIV: 2.375
OD_SPHEQUIV: 3.375
OD_SPHEQUIV: 2.625

## 2022-07-26 ASSESSMENT — REFRACTION_MANIFEST
OD_VA1: 20/25+
OS_SPHERE: +3.00
OS_AXIS: 180
OD_SPHERE: +3.75
OS_VA1: 20/25-2
OD_CYLINDER: -0.75
OS_CYLINDER: -1.25
OD_AXIS: 180

## 2022-07-26 ASSESSMENT — KERATOMETRY
OD_K1POWER_DIOPTERS: 41.75
OD_K2POWER_DIOPTERS: 43.00
OD_AXISANGLE_DEGREES: 098
OS_K1POWER_DIOPTERS: 41.25
OS_K2POWER_DIOPTERS: 43.00
OS_AXISANGLE_DEGREES: 084

## 2022-07-26 ASSESSMENT — REFRACTION_CURRENTRX
OS_CYLINDER: -1.25
OS_OVR_VA: 20/
OD_SPHERE: +4.25
OS_AXIS: 173
OD_CYLINDER: -0.50
OD_AXIS: 008
OS_SPHERE: +3.75
OD_OVR_VA: 20/

## 2022-07-26 ASSESSMENT — AXIALLENGTH_DERIVED
OS_AL: 23.2261
OS_AL: 23.179
OD_AL: 22.9978
OD_AL: 22.7234

## 2023-08-15 ENCOUNTER — NON-APPOINTMENT (OUTPATIENT)
Age: 33
End: 2023-08-15

## 2024-02-02 ENCOUNTER — INPATIENT (INPATIENT)
Facility: HOSPITAL | Age: 34
LOS: 1 days | Discharge: HOME | End: 2024-02-04
Attending: OBSTETRICS & GYNECOLOGY | Admitting: OBSTETRICS & GYNECOLOGY
Payer: COMMERCIAL

## 2024-02-02 ENCOUNTER — TRANSCRIPTION ENCOUNTER (OUTPATIENT)
Age: 34
End: 2024-02-02

## 2024-02-02 VITALS
HEART RATE: 103 BPM | HEIGHT: 65 IN | OXYGEN SATURATION: 98 % | RESPIRATION RATE: 16 BRPM | WEIGHT: 293 LBS | SYSTOLIC BLOOD PRESSURE: 126 MMHG | DIASTOLIC BLOOD PRESSURE: 80 MMHG

## 2024-02-02 LAB
ANISOCYTOSIS BLD QL: SIGNIFICANT CHANGE UP
BASOPHILS # BLD AUTO: 0.27 K/UL — HIGH (ref 0–0.2)
BASOPHILS NFR BLD AUTO: 1.9 % — SIGNIFICANT CHANGE UP (ref 0–2)
BLD GP AB SCN SERPL QL: NEGATIVE — SIGNIFICANT CHANGE UP
EOSINOPHIL # BLD AUTO: 0 K/UL — SIGNIFICANT CHANGE UP (ref 0–0.5)
EOSINOPHIL NFR BLD AUTO: 0 % — SIGNIFICANT CHANGE UP (ref 0–6)
HCT VFR BLD CALC: 32.2 % — LOW (ref 34.5–45)
HGB BLD-MCNC: 10.5 G/DL — LOW (ref 11.5–15.5)
LYMPHOCYTES # BLD AUTO: 1.2 K/UL — SIGNIFICANT CHANGE UP (ref 1–3.3)
LYMPHOCYTES # BLD AUTO: 8.4 % — LOW (ref 13–44)
MANUAL SMEAR VERIFICATION: SIGNIFICANT CHANGE UP
MCHC RBC-ENTMCNC: 26.6 PG — LOW (ref 27–34)
MCHC RBC-ENTMCNC: 32.6 GM/DL — SIGNIFICANT CHANGE UP (ref 32–36)
MCV RBC AUTO: 81.5 FL — SIGNIFICANT CHANGE UP (ref 80–100)
MICROCYTES BLD QL: SIGNIFICANT CHANGE UP
MONOCYTES # BLD AUTO: 0.14 K/UL — SIGNIFICANT CHANGE UP (ref 0–0.9)
MONOCYTES NFR BLD AUTO: 1 % — LOW (ref 2–14)
MYELOCYTES NFR BLD: 0.9 % — HIGH (ref 0–0)
NEUTROPHILS # BLD AUTO: 12.59 K/UL — HIGH (ref 1.8–7.4)
NEUTROPHILS NFR BLD AUTO: 86.9 % — HIGH (ref 43–77)
NEUTS BAND # BLD: 0.9 % — SIGNIFICANT CHANGE UP (ref 0–8)
PLAT MORPH BLD: NORMAL — SIGNIFICANT CHANGE UP
PLATELET # BLD AUTO: 218 K/UL — SIGNIFICANT CHANGE UP (ref 150–400)
POLYCHROMASIA BLD QL SMEAR: SLIGHT — SIGNIFICANT CHANGE UP
RBC # BLD: 3.95 M/UL — SIGNIFICANT CHANGE UP (ref 3.8–5.2)
RBC # FLD: 14.1 % — SIGNIFICANT CHANGE UP (ref 10.3–14.5)
RBC BLD AUTO: ABNORMAL
RH IG SCN BLD-IMP: POSITIVE — SIGNIFICANT CHANGE UP
RH IG SCN BLD-IMP: POSITIVE — SIGNIFICANT CHANGE UP
SMUDGE CELLS # BLD: PRESENT — SIGNIFICANT CHANGE UP
SPHEROCYTES BLD QL SMEAR: SLIGHT — SIGNIFICANT CHANGE UP
WBC # BLD: 14.34 K/UL — HIGH (ref 3.8–10.5)
WBC # FLD AUTO: 14.34 K/UL — HIGH (ref 3.8–10.5)

## 2024-02-02 RX ORDER — TETANUS TOXOID, REDUCED DIPHTHERIA TOXOID AND ACELLULAR PERTUSSIS VACCINE, ADSORBED 5; 2.5; 8; 8; 2.5 [IU]/.5ML; [IU]/.5ML; UG/.5ML; UG/.5ML; UG/.5ML
0.5 SUSPENSION INTRAMUSCULAR ONCE
Refills: 0 | Status: DISCONTINUED | OUTPATIENT
Start: 2024-02-02 | End: 2024-02-04

## 2024-02-02 RX ORDER — ACETAMINOPHEN 500 MG
975 TABLET ORAL
Refills: 0 | Status: DISCONTINUED | OUTPATIENT
Start: 2024-02-02 | End: 2024-02-04

## 2024-02-02 RX ORDER — BENZOCAINE 10 %
1 GEL (GRAM) MUCOUS MEMBRANE EVERY 6 HOURS
Refills: 0 | Status: DISCONTINUED | OUTPATIENT
Start: 2024-02-02 | End: 2024-02-04

## 2024-02-02 RX ORDER — MAGNESIUM HYDROXIDE 400 MG/1
30 TABLET, CHEWABLE ORAL
Refills: 0 | Status: DISCONTINUED | OUTPATIENT
Start: 2024-02-02 | End: 2024-02-04

## 2024-02-02 RX ORDER — IBUPROFEN 200 MG
600 TABLET ORAL EVERY 6 HOURS
Refills: 0 | Status: COMPLETED | OUTPATIENT
Start: 2024-02-02 | End: 2024-12-31

## 2024-02-02 RX ORDER — OXYTOCIN 10 UNIT/ML
41.67 VIAL (ML) INJECTION
Qty: 20 | Refills: 0 | Status: DISCONTINUED | OUTPATIENT
Start: 2024-02-02 | End: 2024-02-04

## 2024-02-02 RX ORDER — AMPICILLIN TRIHYDRATE 250 MG
1 CAPSULE ORAL EVERY 4 HOURS
Refills: 0 | Status: DISCONTINUED | OUTPATIENT
Start: 2024-02-02 | End: 2024-02-02

## 2024-02-02 RX ORDER — OXYCODONE HYDROCHLORIDE 5 MG/1
5 TABLET ORAL
Refills: 0 | Status: DISCONTINUED | OUTPATIENT
Start: 2024-02-02 | End: 2024-02-04

## 2024-02-02 RX ORDER — SODIUM CHLORIDE 9 MG/ML
1000 INJECTION, SOLUTION INTRAVENOUS
Refills: 0 | Status: DISCONTINUED | OUTPATIENT
Start: 2024-02-02 | End: 2024-02-02

## 2024-02-02 RX ORDER — AER TRAVELER 0.5 G/1
1 SOLUTION RECTAL; TOPICAL EVERY 4 HOURS
Refills: 0 | Status: DISCONTINUED | OUTPATIENT
Start: 2024-02-02 | End: 2024-02-04

## 2024-02-02 RX ORDER — CITRIC ACID/SODIUM CITRATE 300-500 MG
15 SOLUTION, ORAL ORAL EVERY 6 HOURS
Refills: 0 | Status: DISCONTINUED | OUTPATIENT
Start: 2024-02-02 | End: 2024-02-02

## 2024-02-02 RX ORDER — KETOROLAC TROMETHAMINE 30 MG/ML
30 SYRINGE (ML) INJECTION ONCE
Refills: 0 | Status: DISCONTINUED | OUTPATIENT
Start: 2024-02-02 | End: 2024-02-02

## 2024-02-02 RX ORDER — OXYTOCIN 10 UNIT/ML
333.33 VIAL (ML) INJECTION
Qty: 20 | Refills: 0 | Status: DISCONTINUED | OUTPATIENT
Start: 2024-02-02 | End: 2024-02-02

## 2024-02-02 RX ORDER — DIPHENHYDRAMINE HCL 50 MG
25 CAPSULE ORAL EVERY 6 HOURS
Refills: 0 | Status: DISCONTINUED | OUTPATIENT
Start: 2024-02-02 | End: 2024-02-04

## 2024-02-02 RX ORDER — SODIUM CHLORIDE 9 MG/ML
3 INJECTION INTRAMUSCULAR; INTRAVENOUS; SUBCUTANEOUS EVERY 8 HOURS
Refills: 0 | Status: DISCONTINUED | OUTPATIENT
Start: 2024-02-02 | End: 2024-02-04

## 2024-02-02 RX ORDER — NALOXONE HYDROCHLORIDE 4 MG/.1ML
0.1 SPRAY NASAL
Refills: 0 | Status: DISCONTINUED | OUTPATIENT
Start: 2024-02-02 | End: 2024-02-02

## 2024-02-02 RX ORDER — CHLORHEXIDINE GLUCONATE 213 G/1000ML
1 SOLUTION TOPICAL DAILY
Refills: 0 | Status: DISCONTINUED | OUTPATIENT
Start: 2024-02-02 | End: 2024-02-02

## 2024-02-02 RX ORDER — LANOLIN
1 OINTMENT (GRAM) TOPICAL EVERY 6 HOURS
Refills: 0 | Status: DISCONTINUED | OUTPATIENT
Start: 2024-02-02 | End: 2024-02-04

## 2024-02-02 RX ORDER — DIBUCAINE 1 %
1 OINTMENT (GRAM) RECTAL EVERY 6 HOURS
Refills: 0 | Status: DISCONTINUED | OUTPATIENT
Start: 2024-02-02 | End: 2024-02-04

## 2024-02-02 RX ORDER — OXYTOCIN 10 UNIT/ML
VIAL (ML) INJECTION
Qty: 30 | Refills: 0 | Status: DISCONTINUED | OUTPATIENT
Start: 2024-02-02 | End: 2024-02-02

## 2024-02-02 RX ORDER — HYDROCORTISONE 1 %
1 OINTMENT (GRAM) TOPICAL EVERY 6 HOURS
Refills: 0 | Status: DISCONTINUED | OUTPATIENT
Start: 2024-02-02 | End: 2024-02-04

## 2024-02-02 RX ORDER — IBUPROFEN 200 MG
600 TABLET ORAL EVERY 6 HOURS
Refills: 0 | Status: DISCONTINUED | OUTPATIENT
Start: 2024-02-02 | End: 2024-02-04

## 2024-02-02 RX ORDER — FENTANYL/BUPIVACAINE/NS/PF 2MCG/ML-.1
250 PLASTIC BAG, INJECTION (ML) INJECTION
Refills: 0 | Status: DISCONTINUED | OUTPATIENT
Start: 2024-02-02 | End: 2024-02-02

## 2024-02-02 RX ORDER — ONDANSETRON 8 MG/1
4 TABLET, FILM COATED ORAL EVERY 6 HOURS
Refills: 0 | Status: DISCONTINUED | OUTPATIENT
Start: 2024-02-02 | End: 2024-02-02

## 2024-02-02 RX ORDER — SIMETHICONE 80 MG/1
80 TABLET, CHEWABLE ORAL EVERY 4 HOURS
Refills: 0 | Status: DISCONTINUED | OUTPATIENT
Start: 2024-02-02 | End: 2024-02-04

## 2024-02-02 RX ORDER — AMPICILLIN TRIHYDRATE 250 MG
2 CAPSULE ORAL ONCE
Refills: 0 | Status: COMPLETED | OUTPATIENT
Start: 2024-02-02 | End: 2024-02-02

## 2024-02-02 RX ORDER — PRAMOXINE HYDROCHLORIDE 150 MG/15G
1 AEROSOL, FOAM RECTAL EVERY 4 HOURS
Refills: 0 | Status: DISCONTINUED | OUTPATIENT
Start: 2024-02-02 | End: 2024-02-04

## 2024-02-02 RX ORDER — DEXAMETHASONE 0.5 MG/5ML
4 ELIXIR ORAL EVERY 6 HOURS
Refills: 0 | Status: DISCONTINUED | OUTPATIENT
Start: 2024-02-02 | End: 2024-02-02

## 2024-02-02 RX ADMIN — Medication 216 GRAM(S): at 13:11

## 2024-02-02 RX ADMIN — Medication 108 GRAM(S): at 17:05

## 2024-02-02 RX ADMIN — SODIUM CHLORIDE 125 MILLILITER(S): 9 INJECTION, SOLUTION INTRAVENOUS at 13:00

## 2024-02-02 RX ADMIN — Medication 30 MILLIGRAM(S): at 21:03

## 2024-02-02 RX ADMIN — Medication 30 MILLIGRAM(S): at 20:26

## 2024-02-02 RX ADMIN — SODIUM CHLORIDE 3 MILLILITER(S): 9 INJECTION INTRAMUSCULAR; INTRAVENOUS; SUBCUTANEOUS at 22:30

## 2024-02-02 NOTE — OB RN PATIENT PROFILE - VISION (WITH CORRECTIVE LENSES IF THE PATIENT USUALLY WEARS THEM):
Glasses for computer work/Partially impaired: cannot see medication labels or newsprint, but can see obstacles in path, and the surrounding layout; can count fingers at arm's length

## 2024-02-02 NOTE — OB RN PATIENT PROFILE - FALL HARM RISK - HARM RISK INTERVENTIONS

## 2024-02-02 NOTE — OB RN DELIVERY SUMMARY - NSSELHIDDEN_OBGYN_ALL_OB_FT
[NS_DeliveryAttending1_OBGYN_ALL_OB_FT:[id number]==],[NS_DeliveryRN_OBGYN_ALL_OB_FT:[id number]=]

## 2024-02-02 NOTE — OB PROVIDER H&P - NSLOWPPHRISK_OBGYN_A_OB
No previous uterine incision/Gabriel Pregnancy/Less than or equal to 4 previous vaginal births/No known bleeding disorder/No history of postpartum hemorrhage/No other PPH risks indicated

## 2024-02-02 NOTE — OB RN DELIVERY SUMMARY - NS_DELIVERYATTENDING1_OBGYN_ALL_OB_FT
PATIENT STATES PAIN IS WORSE WITH MOVEMENT. PRN MORPHINE GIVEN AND PATIENT REPORTS RELIEF.

COLOSTOMY BAG WITH MODERATE AMOUNT OF OUTPUT WITH SOME LEAKING. BAG CHANGED, DEMONSTRATED TO 
PATIENT AND VERBAL INSTRUCTIONS GIVEN. PATIENT VERBALIZES UNDERSTANDING BUT NEEDS 
REINFORCEMENT. 

YVONNE DRAIN EMPTIED, SEROSANGUINEOUS OUTPUT NOTED. 

AMBULATED IN HALLWAY X2 ROUNDS, TOLERATED WELL. RETURNED TO BED, SCDS APPLIED, INCENTIVE 
SPIROMETER ENCOURAGED. ALL NEEDS MET AT THIS TIME. Kiki Horne MD

## 2024-02-02 NOTE — OB PROVIDER H&P - HISTORY OF PRESENT ILLNESS
Subjective:  HPI: 33y yo Female  at 38w4d presents for labor at term. Patient was seen by her primary OBGYN this morning and reported irregular contractions every 5-10min that are nonpainful. She is not requesting an epidural. At that time she was noted to be 5/70/-2.  - Fetal movement: endorses  - Contractions: as above  - Leakage of fluid: denies  - Vaginal bleeding: denies    Antepartum:  - Pregnancy type: spontaneous  - Testing: NIPT wnl  - Anatomy scan: wnl  - GCT/GTT: wnl  - Hypertensive disorders of pregnancy: denies  - GBS status: positive  - EFW: 3200g    - ObHx: G1   BW 3100g  - GynHx: denies  - PMH: denies  - PSH: denies  - SH: denies toxic habits  - Meds: Prenatal vitamins  - Allergies: No Known Allergies        Objective:  T(C): --  HR: --  BP: --  RR: --  SpO2: --  General: No apparent distress; Lying comfortably in bed  Pulmonary: No increased work of breathing  Abdomen: Soft; Nontender; Gravid  Extremities: Trace pedal edema bilaterally; No calf tenderness bilaterally  Neurological: Gross movements intact  Psychiatric: Appropriate mood and affect  Skin: No rashes or jaundice  SVE: 5/70/-2 (performed by attending)  TAUS: cephalic fetus    FHT: baseline 140; moderate variability; +accels; -decels; reactive and reassuring tracing  TOCO: irregular contractions 2-3 in 10min          Assessment:  34 yo  at 38w4d presents for labor. Maternal and fetal statuses reassuring at this time. Will plan for augmentation with pitocin.    Plan:  - Admit to L&D floor  - Labs sent: routine labs  - Diet: NPO  - Continuous electronic fetal monitoring and tocometry  - Pain management with epidural prn  - GBS status as above: ampicillin ordered  - All questions were answered and concerns addressed. Patient verbally expressed understanding.  - Discussed with senior resident Dr. Gonzalez

## 2024-02-03 LAB — T PALLIDUM AB TITR SER: NEGATIVE — SIGNIFICANT CHANGE UP

## 2024-02-03 RX ADMIN — Medication 975 MILLIGRAM(S): at 18:40

## 2024-02-03 RX ADMIN — Medication 600 MILLIGRAM(S): at 03:00

## 2024-02-03 RX ADMIN — Medication 975 MILLIGRAM(S): at 06:59

## 2024-02-03 RX ADMIN — Medication 975 MILLIGRAM(S): at 23:02

## 2024-02-03 RX ADMIN — Medication 975 MILLIGRAM(S): at 12:17

## 2024-02-03 RX ADMIN — Medication 975 MILLIGRAM(S): at 01:28

## 2024-02-03 RX ADMIN — Medication 600 MILLIGRAM(S): at 21:30

## 2024-02-03 RX ADMIN — Medication 600 MILLIGRAM(S): at 09:30

## 2024-02-03 RX ADMIN — Medication 975 MILLIGRAM(S): at 18:10

## 2024-02-03 RX ADMIN — Medication 1 TABLET(S): at 12:16

## 2024-02-03 RX ADMIN — Medication 600 MILLIGRAM(S): at 02:45

## 2024-02-03 RX ADMIN — Medication 975 MILLIGRAM(S): at 12:47

## 2024-02-03 RX ADMIN — SODIUM CHLORIDE 3 MILLILITER(S): 9 INJECTION INTRAMUSCULAR; INTRAVENOUS; SUBCUTANEOUS at 22:31

## 2024-02-03 RX ADMIN — Medication 600 MILLIGRAM(S): at 10:00

## 2024-02-03 RX ADMIN — Medication 975 MILLIGRAM(S): at 01:57

## 2024-02-03 RX ADMIN — SODIUM CHLORIDE 3 MILLILITER(S): 9 INJECTION INTRAMUSCULAR; INTRAVENOUS; SUBCUTANEOUS at 05:08

## 2024-02-03 RX ADMIN — Medication 600 MILLIGRAM(S): at 16:05

## 2024-02-03 RX ADMIN — Medication 600 MILLIGRAM(S): at 20:59

## 2024-02-03 RX ADMIN — Medication 975 MILLIGRAM(S): at 06:13

## 2024-02-03 RX ADMIN — Medication 600 MILLIGRAM(S): at 16:35

## 2024-02-03 NOTE — DISCHARGE NOTE OB - PATIENT PORTAL LINK FT
You can access the FollowMyHealth Patient Portal offered by [facility] by registering at the following website: [email or web address]. By joining [facility]’s FollowMyHealth portal, you will also be able to view your health information using other applications (apps) compatible with our system.

## 2024-02-03 NOTE — DISCHARGE NOTE OB - CARE PROVIDER_API CALL
Kiki Horne  Obstetrics and Gynecology  [address] [place] [id number]  Phone: [phone]  Fax: [phone]  Follow Up Time:

## 2024-02-03 NOTE — DISCHARGE NOTE OB - MEDICATION SUMMARY - MEDICATIONS TO TAKE
I will START or STAY ON the medications listed below when I get home from the hospital:    ibuprofen 600 mg oral tablet  -- 1 tab(s) by mouth every 6 hours, As Needed  -- Indication: For pain    acetaminophen 325 mg oral tablet  -- 3 tab(s) by mouth every 6 hours, As Needed  -- Indication: For pain    Prenatal Multivitamins with Folic Acid 1 mg oral tablet  -- 1 tab(s) by mouth once a day  -- Indication: For postpartum state

## 2024-02-03 NOTE — PROGRESS NOTE ADULT - SUBJECTIVE AND OBJECTIVE BOX
Patient evaluated at bedside this morning, resting comfortable in bed, no acute events overnight. She reports pain is well-controlled.  She denies headache, dizziness, changes in vision, chest pain, palpitations, shortness of breath, RUQ pain, nausea, vomiting, fever, chills, heavy vaginal bleeding.  She has been ambulating without assistance, voiding spontaneously, tolerating food.      Physical Exam:  T(C): 36.6 (02-03-24 @ 06:15), Max: 37.4 (02-02-24 @ 21:54)  HR: 78 (02-03-24 @ 06:15) (78 - 102)  BP: 115/74 (02-03-24 @ 06:15) (104/64 - 122/75)  RR: 17 (02-03-24 @ 06:15) (16 - 18)  SpO2: 96% (02-03-24 @ 06:15) (96% - 100%)    Gen: no apparent distress  Pulm: no increased work of breathing  Abd: soft, nontender, nondistended, no rebound or guarding, uterus firm  Extremities: trace pedal edema bilaterally, no calf tenderness bilaterally                          10.5   14.34 )-----------( 218      ( 02 Feb 2024 12:49 )             32.2           acetaminophen     Tablet .. 975 milliGRAM(s) Oral <User Schedule>  benzocaine 20%/menthol 0.5% Spray 1 Spray(s) Topical every 6 hours PRN  dibucaine 1% Ointment 1 Application(s) Topical every 6 hours PRN  diphenhydrAMINE 25 milliGRAM(s) Oral every 6 hours PRN  diphtheria/tetanus/pertussis (acellular) Vaccine (Adacel) 0.5 milliLiter(s) IntraMuscular once  hydrocortisone 1% Cream 1 Application(s) Topical every 6 hours PRN  ibuprofen  Tablet. 600 milliGRAM(s) Oral every 6 hours  lanolin Ointment 1 Application(s) Topical every 6 hours PRN  magnesium hydroxide Suspension 30 milliLiter(s) Oral two times a day PRN  oxyCODONE    IR 5 milliGRAM(s) Oral every 3 hours PRN  oxytocin Infusion 41.667 milliUNIT(s)/Min IV Continuous <Continuous>  pramoxine 1%/zinc 5% Cream 1 Application(s) Topical every 4 hours PRN  prenatal multivitamin 1 Tablet(s) Oral daily  simethicone 80 milliGRAM(s) Chew every 4 hours PRN  sodium chloride 0.9% lock flush 3 milliLiter(s) IV Push every 8 hours  witch hazel Pads 1 Application(s) Topical every 4 hours PRN

## 2024-02-03 NOTE — LACTATION INITIAL EVALUATION - LACTATION INTERVENTIONS
initiate/review safe skin-to-skin/initiate/review hand expression/initiate/review techniques for position and latch/reviewed components of an effective feeding and at least 8 effective feedings per day required/reviewed importance of monitoring infant diapers, the breastfeeding log, and minimum output each day/reviewed benefits and recommendations for rooming in/reviewed feeding on demand/by cue at least 8 times a day/recommended follow-up with pediatrician within 24 hours of discharge

## 2024-02-03 NOTE — DISCHARGE NOTE OB - HOSPITAL COURSE
Patient had uncomplicated, nonsurgical vaginal delivery.  Please see delivery note for details.  During postpartum course patient's vitals were stable, vaginal bleeding appropriate, and pain well controlled.  On day of discharge patient was ambulating, her pain controlled with oral medications, had adequate oral intake, and was voiding freely.  Discharge instructions and precautions were given.  Will return to clinic in 6 weeks for postpartum visit.

## 2024-02-04 VITALS
TEMPERATURE: 98 F | OXYGEN SATURATION: 98 % | HEART RATE: 83 BPM | DIASTOLIC BLOOD PRESSURE: 80 MMHG | SYSTOLIC BLOOD PRESSURE: 106 MMHG | RESPIRATION RATE: 18 BRPM

## 2024-02-04 PROCEDURE — 86901 BLOOD TYPING SEROLOGIC RH(D): CPT

## 2024-02-04 PROCEDURE — 36415 COLL VENOUS BLD VENIPUNCTURE: CPT

## 2024-02-04 PROCEDURE — 86900 BLOOD TYPING SEROLOGIC ABO: CPT

## 2024-02-04 PROCEDURE — 59050 FETAL MONITOR W/REPORT: CPT

## 2024-02-04 PROCEDURE — 86780 TREPONEMA PALLIDUM: CPT

## 2024-02-04 PROCEDURE — 85025 COMPLETE CBC W/AUTO DIFF WBC: CPT

## 2024-02-04 PROCEDURE — 86850 RBC ANTIBODY SCREEN: CPT

## 2024-02-04 RX ADMIN — Medication 1 APPLICATION(S): at 12:49

## 2024-02-04 RX ADMIN — SODIUM CHLORIDE 3 MILLILITER(S): 9 INJECTION INTRAMUSCULAR; INTRAVENOUS; SUBCUTANEOUS at 06:01

## 2024-02-04 RX ADMIN — Medication 600 MILLIGRAM(S): at 09:49

## 2024-02-04 RX ADMIN — Medication 975 MILLIGRAM(S): at 00:31

## 2024-02-04 RX ADMIN — Medication 975 MILLIGRAM(S): at 06:40

## 2024-02-04 RX ADMIN — Medication 600 MILLIGRAM(S): at 02:04

## 2024-02-04 RX ADMIN — Medication 600 MILLIGRAM(S): at 02:55

## 2024-02-04 RX ADMIN — Medication 1 TABLET(S): at 12:41

## 2024-02-04 RX ADMIN — Medication 975 MILLIGRAM(S): at 05:49

## 2024-02-04 RX ADMIN — Medication 975 MILLIGRAM(S): at 12:41

## 2024-02-04 NOTE — PROGRESS NOTE ADULT - SUBJECTIVE AND OBJECTIVE BOX
Patient evaluated at bedside this morning, resting comfortable in bed, no acute events overnight. She reports pain is well-controlled.  She denies headache, dizziness, changes in vision, chest pain, palpitations, shortness of breath, RUQ pain, nausea, vomiting, fever, chills, heavy vaginal bleeding.  She has been ambulating without assistance, voiding spontaneously, tolerating food.      Physical Exam:  T(C): 36.5 (02-04-24 @ 05:46), Max: 36.5 (02-04-24 @ 05:46)  HR: 82 (02-04-24 @ 05:46) (82 - 82)  BP: 116/71 (02-04-24 @ 05:46) (116/71 - 116/71)  RR: 17 (02-04-24 @ 05:46) (17 - 17)  SpO2: 96% (02-04-24 @ 05:46) (96% - 96%)    Gen: no apparent distress  Pulm: no increased work of breathing  Abd: soft, nontender, nondistended, no rebound or guarding, uterus firm  Extremities: trace pedal edema bilaterally, no calf tenderness bilaterally                          10.5   14.34 )-----------( 218      ( 02 Feb 2024 12:49 )             32.2           acetaminophen     Tablet .. 975 milliGRAM(s) Oral <User Schedule>  benzocaine 20%/menthol 0.5% Spray 1 Spray(s) Topical every 6 hours PRN  dibucaine 1% Ointment 1 Application(s) Topical every 6 hours PRN  diphenhydrAMINE 25 milliGRAM(s) Oral every 6 hours PRN  diphtheria/tetanus/pertussis (acellular) Vaccine (Adacel) 0.5 milliLiter(s) IntraMuscular once  hydrocortisone 1% Cream 1 Application(s) Topical every 6 hours PRN  ibuprofen  Tablet. 600 milliGRAM(s) Oral every 6 hours  lanolin Ointment 1 Application(s) Topical every 6 hours PRN  magnesium hydroxide Suspension 30 milliLiter(s) Oral two times a day PRN  oxyCODONE    IR 5 milliGRAM(s) Oral every 3 hours PRN  oxytocin Infusion 41.667 milliUNIT(s)/Min IV Continuous <Continuous>  pramoxine 1%/zinc 5% Cream 1 Application(s) Topical every 4 hours PRN  prenatal multivitamin 1 Tablet(s) Oral daily  simethicone 80 milliGRAM(s) Chew every 4 hours PRN  sodium chloride 0.9% lock flush 3 milliLiter(s) IV Push every 8 hours  witch hazel Pads 1 Application(s) Topical every 4 hours PRN

## 2024-02-04 NOTE — PROGRESS NOTE ADULT - ASSESSMENT
A/P  33y s/p , PPD# 2, stable, meeting postpartum milestones   - Pain: well controlled on analgesics as above  - GI: Tolerating regular diet  - : urinating without difficulty/pain  - DVT prophylaxis: ambulating frequently  - Dispo: PPD 2, unless otherwise specified    
A/P  33y s/p , PPD# 1, stable, meeting postpartum milestones   - Pain: well controlled on analgesics as above  - GI: Tolerating regular diet  - : urinating without difficulty/pain  - DVT prophylaxis: ambulating frequently  - Dispo: PPD 2, unless otherwise specified

## 2024-02-08 DIAGNOSIS — Z28.09 IMMUNIZATION NOT CARRIED OUT BECAUSE OF OTHER CONTRAINDICATION: ICD-10-CM

## 2024-02-08 DIAGNOSIS — Z3A.38 38 WEEKS GESTATION OF PREGNANCY: ICD-10-CM

## 2025-04-01 NOTE — OB RN PATIENT PROFILE - FUNCTIONAL ASSESSMENT - DAILY ACTIVITY 3.
Assistance with ambulation/Assistance OOB with selected safe patient handling equipment/Communicate Risk of Fall with Harm to all staff/Discuss with provider need for PT consult/Monitor gait and stability/Reinforce activity limits and safety measures with patient and family/Tailored Fall Risk Interventions/Visual Cue: Yellow wristband and red socks/Bed in lowest position, wheels locked, appropriate side rails in place/Call bell, personal items and telephone in reach/Instruct patient to call for assistance before getting out of bed or chair/Non-slip footwear when patient is out of bed/[place] to call system/Physically safe environment - no spills, clutter or unnecessary equipment/Purposeful Proactive Rounding/Room/bathroom lighting operational, light cord in reach 4 = No assist / stand by assistance